# Patient Record
Sex: FEMALE | Race: WHITE
[De-identification: names, ages, dates, MRNs, and addresses within clinical notes are randomized per-mention and may not be internally consistent; named-entity substitution may affect disease eponyms.]

---

## 2018-05-14 ENCOUNTER — HOSPITAL ENCOUNTER (OUTPATIENT)
Dept: HOSPITAL 60 - LB.SDS | Age: 63
Discharge: HOME | End: 2018-05-14
Attending: ORTHOPAEDIC SURGERY
Payer: COMMERCIAL

## 2018-05-14 DIAGNOSIS — I10: ICD-10-CM

## 2018-05-14 DIAGNOSIS — M81.0: ICD-10-CM

## 2018-05-14 DIAGNOSIS — E11.9: ICD-10-CM

## 2018-05-14 DIAGNOSIS — F32.9: ICD-10-CM

## 2018-05-14 DIAGNOSIS — Z87.891: ICD-10-CM

## 2018-05-14 DIAGNOSIS — Z88.0: ICD-10-CM

## 2018-05-14 DIAGNOSIS — K21.9: ICD-10-CM

## 2018-05-14 DIAGNOSIS — E78.5: ICD-10-CM

## 2018-05-14 DIAGNOSIS — M18.11: Primary | ICD-10-CM

## 2018-05-14 DIAGNOSIS — J45.909: ICD-10-CM

## 2018-05-14 DIAGNOSIS — Z79.899: ICD-10-CM

## 2018-05-14 DIAGNOSIS — G47.00: ICD-10-CM

## 2018-05-14 DIAGNOSIS — Z91.048: ICD-10-CM

## 2018-05-15 NOTE — OR
DATE OF OPERATION:  05/14/2018

 

PREOPERATIVE DIAGNOSIS:  Advanced first carpometacarpal arthritis.

 

POSTOPERATIVE DIAGNOSIS:  Advanced first carpometacarpal arthritis.

 

PROCEDURE:  Right first carpometacarpal interpositional arthroplasty.

 

ANESTHESIA:  General.

 

ASSISTANT:  Ken Webb RN.

 

SPECIMENS:  None.

 

DRAINS:  None.

 

ESTIMATED BLOOD LOSS:  Minimal.

 

COMPLICATIONS:  None apparent.

 

DESCRIPTION OF PROCEDURE:  After informed consent was obtained, the patient was brought to

the operating room where general anesthetic was administered uneventfully.  The right upper

extremity was prepped and draped sterilely, a time-out was held, and antibiotics were

confirmed.  The incision was made over the first CMC joint at the junction of the glabrous

and non-glabrous skin.  We dissected sharply through the skin with careful scissor

dissection through subcutaneous tissues to protect and preserve the superficial radial

sensory nerve branches.  The capsule overlying the trapezium was opened longitudinally and

full-thickness flaps were established.  We subperiosteally dissected around the trapezium,

until we were able to expose it sufficiently to be able to perform a cruciate osteotomy.  It

was then removed piecemeal.  We made a short transverse incision at the junction of the

musculotendinous junction of the flexor carpi radialis, we then delivered it into the

arthroplasty space.  We used a 3-0 nylon to create an arthroplasty spacer balling the FCR.

This was then placed into the arthroplasty space.  We closed the capsule with 2-0 Vicryl

over the top of this.  We closed the skin with 4-0 nylon.  Xeroform sterile dressings were

applied as well as thumb spica splint.  The patient was brought to the recovery room in

stable condition, having tolerated the procedure well, with no apparent complications..

 

 

RYAN/HAILEY

DD:  05/14/2018 19:16:48

DT:  05/15/2018 02:19:22

Job #:  [id number]

## 2019-02-07 ENCOUNTER — HOSPITAL ENCOUNTER (OUTPATIENT)
Dept: HOSPITAL 60 - LB.SDS | Age: 64
Discharge: HOME | End: 2019-02-07
Attending: SURGERY
Payer: COMMERCIAL

## 2019-02-07 DIAGNOSIS — Z79.899: ICD-10-CM

## 2019-02-07 DIAGNOSIS — I10: ICD-10-CM

## 2019-02-07 DIAGNOSIS — K21.9: ICD-10-CM

## 2019-02-07 DIAGNOSIS — R13.10: Primary | ICD-10-CM

## 2019-02-07 DIAGNOSIS — J45.909: ICD-10-CM

## 2019-02-07 NOTE — OR
DATE OF OPERATION:  02/07/2019

 

PREOPERATIVE DIAGNOSIS:  Dysphagia.

 

POSTOPERATIVE DIAGNOSIS:  Dysphagia.

 

PROCEDURE:  Esophagogastroduodenoscopy with biopsy.

 

ANESTHESIA:  MAC.

 

ESTIMATED BLOOD LOSS:  Minimal.

 

COMPLICATIONS:  None.

 

INDICATION FOR THE PROCEDURE:  The patient is a 64-year-old female who for the past several

years has had some difficulty with swallowing.  She does feel like solids potentially and

sometimes pills do get stuck in her chest.  Does not have any trouble with liquids.  She has

been on ranitidine.  She has not tried anything else.

 

DESCRIPTION OF PROCEDURE:  Informed consent was obtained from the patient.  The patient was

taken to the operating room and placed on table in left lateral decubitus position.

Monitored anesthesia care was administered.  Esophagogastroduodenoscope was then advanced

through the mouth and directed towards the second portion of the duodenum.  Duodenum was

normal.  No signs of duodenitis.  No ulcers.  Gastric antrum did appear to be normal.  No

gastritis.  No ulcers.  Cold forceps biopsy was taken of the gastric antrum and checked that

for H. pylori.  Retroflexion performed was also otherwise unremarkable.

Esophagogastroduodenoscope was then withdrawn into the esophagus.  Very minimal esophagitis

possible, otherwise distal esophagus GE junction was normal.  Remainder of the esophagus

examined on the way out was also otherwise unremarkable.  Esophagogastroduodenoscope was

then withdrawn.  The patient tolerated the procedure well and was brought to recovery room

in good condition.

 

FINDINGS:  Possible mild esophagitis at the GE junction.

 

RECOMMENDATIONS:  We will follow up on H. pylori biopsies, otherwise would recommend

starting PPI therapy for the next 2 months and monitoring her progress.  If she continues to

have signs and symptoms of dysphagia, I would recommend esophagram.

 

 

MN/HAILEY

DD:  02/07/2019 12:23:15

DT:  02/07/2019 19:02:42

Job #:  [id number]

## 2020-07-17 NOTE — CT
DATE OF SERVICE: 07/17/20

CLINICAL DATA: Elevated D-dimer.



ENHANCED CHEST CT:  Multislice axial acquisition with IV contrast was performed.




No priors. 



No evidence of PE.  No pneumothorax.  No pleural effusions.  No aortic aneurysm 
or dissection.  



There are minimal atelectatic changes in the dependent portion of both lungs.  
There are emphysematous changes throughout both lungs.  There is a 7 mm pleural-
based nodule in the right lower lobe posteromedially.  The lung bases are 
otherwise clear. 



The heart size is normal.  No pericardial effusion.  



No hilar or mediastinal adenopathy.  



There are multiple small calcified gallstones noted in the dependent 
gallbladder.  No pericholecystic fluid.  



No other significant findings.  



IMPRESSION:  

1.  No acute abnormalities. 

2.  Cholelithiasis.  No evidence of cholecystitis.  

3.  7 mm pleural-based nodule right lower lobe.  3-6 month followup CT is 
recommended.  



[id number]

MTDD

## 2020-07-17 NOTE — EDM.PDOC
ED HPI GENERAL MEDICAL PROBLEM





- General


Chief Complaint: Lower Extremity Injury/Pain


Stated Complaint: PAIN


Time Seen by Provider: 07/17/20 10:15


Source of Information: Reports: Patient


History Limitations: Reports: No Limitations





- History of Present Illness


INITIAL COMMENTS - FREE TEXT/NARRATIVE: 





pt presents to the ER with right knee pain mostly in the posterior fossa. pt is 

post R TKA on 7 July and has been doing PT since last week. over the last 3-4 

days the pain has worsened with increased cramping in her right calf, this is 

also TTP. pt states she intermittently experiences dizzyness/lightheadedness 

with coinciding sweating. the cramping, dizzyness, lightheadedness and sweating 

increases with activity, improvement with rest. she denies fever, chills, 

nausea, cough, SOB, chest pain. 


pt provides family history of several relatives with DVT with sister dying of 

PE. pt admits to at least one missed dose of warfarin this week.


pt was evaluated in the context of the covid 19 pandemic.





- Related Data


                                    Allergies











Allergy/AdvReac Type Severity Reaction Status Date / Time


 


alcohol Allergy  Rash Verified 01/07/19 09:16


 


Penicillins Allergy  Hives Verified 01/07/19 09:16











Home Meds: 


                                    Home Meds





Amitriptyline [Elavil] 75 mg PO DAILY 05/06/18 [History]


Cyclobenzaprine HCl 10 mg PO TID 05/06/18 [History]


Fluticasone Propion/Salmeterol [Advair 250-50 Diskus] 2 inh INH BID 05/06/18 

[History]


Furosemide 40 mg PO ACDINNER 05/06/18 [History]


Hydrocodone/Acetaminophen [Hydrocodon-Acetaminophen 5-325] 1 tab PO Q6HR PRN 

05/06/18 [History]


Ipratropium [Atrovent HFA] 2 inh INH QID PRN 05/06/18 [History]


Ranitidine HCl [Ranitidine] 150 mg PO BID 05/06/18 [History]


Simvastatin [Zocor] 20 mg PO DAILY 05/06/18 [History]











Past Medical History


HEENT History: Reports: Hard of Hearing, Other (See Below)


Other HEENT History: wears hearing aid


Cardiovascular History: Reports: High Cholesterol, Hypertension, Other (See 

Below)


Other Cardiovascular History: Hyperlipidemia


Respiratory History: Reports: Asthma


Gastrointestinal History: Reports: GERD


Other Gastrointestinal History: hx of esophageal ulcers


OB/GYN History: Reports: Pregnancy


Musculoskeletal History: Reports: Osteoporosis, Other (See Below)


Other Musculoskeletal History: degenerative joint disease


Neurological History: Reports: Migraines, TIA


Psychiatric History: Reports: Depression


Endocrine/Metabolic History: Reports: Diabetes, Type II, Osteoporosis


Oncologic (Cancer) History: Reports: Basal Cell Carcinoma, Ovarian





- Infectious Disease History


Infectious Disease History: Reports: Chicken Pox





- Past Surgical History


Musculoskeletal Surgical History: Reports: Carpal Tunnel





Social & Family History





- Family History


Family Medical History: Noncontributory


HEENT: Reports: Cataract, Sinusitis


Cardiac: Reports: Hypertension


Respiratory: Reports: PE


: Reports: None


Musculoskeletal: Reports: Arthritis, Back pain, Chronic, Osteoarthritis, 

Osteoporosis


Neurological: Reports: Migraines


Endocrine/Metabolic: Reports: Diabetes, type II


Hematologic: Reports: None


Immunologic: Reports: None


Oncologic: Reports: Breast, Ovarian, Uterine, Other (See Below)


Other Oncologic Family History: fallopian





- Caffeine Use


Caffeine Use: Reports: Coffee





ED ROS GENERAL





- Review of Systems


Review Of Systems: Comprehensive ROS is negative, except as noted in HPI.





ED EXAM, GENERAL





- Physical Exam


Exam: See Below


Exam Limited By: No Limitations


General Appearance: Alert, WD/WN, No Apparent Distress


Eye Exam: Bilateral Eye: EOMI, PERRL


Head: Atraumatic, Normocephalic


Neck: Normal Inspection


Respiratory/Chest: No Respiratory Distress, Lungs Clear, Normal Breath Sounds, 

No Accessory Muscle Use


Cardiovascular: Normal Peripheral Pulses, Regular Rate, Rhythm, No JVD, No 

Murmur


Peripheral Pulses: 2+: Radial (L), Radial (R), Dorsalis Pedis (L), Dorsalis 

Pedis (R)


GI/Abdominal: Normal Bowel Sounds, Soft, Non-Tender


Extremities: Other (R calf and posterior fossa of the knee are TTP on midline. 

bedside US shows compressible veins throughout fossa and lower leg structures.)


Neurological: Alert, Oriented, CN II-XII Intact, Normal Cognition


Psychiatric: Normal Affect, Normal Mood


Skin Exam: Warm, Dry, Intact


Lymphatic: No Adenopathy





Course





- Orders/Labs/Meds


Orders: 





                               Active Orders 24 hr











 Category Date Time Status


 


 Chest 1V Frontal [CR] Stat Exams  07/17/20 10:09 Ordered


 


 CBC WITH AUTO DIFF [HEME] Stat Lab  07/17/20 10:09 Ordered


 


 COMPREHENSIVE METABOLIC PN,CMP [CHEM] Stat Lab  07/17/20 10:09 Ordered


 


 D-DIMER QUANTITATIVE [COAG] Stat Lab  07/17/20 10:09 Ordered


 


 PTT,PARTIAL THROMBOPLSTIN TIME [COAG] Stat Lab  07/17/20 10:10 Ordered














- Radiology Interpretation


Free Text/Narrative:: 





cxr shows no widened mediastinum, pneumothoracies, consolodation or infiltrates.

 no bony abnormalities.





Departure





- Departure


Time of Disposition: 13:00


Disposition: Refer to Observation


Condition: Fair


Clinical Impression: 


 DVT (deep venous thrombosis)








- Discharge Information


*PRESCRIPTION DRUG MONITORING PROGRAM REVIEWED*: Not Applicable


*COPY OF PRESCRIPTION DRUG MONITORING REPORT IN PATIENT SHAY: Not Applicable


Referrals: 


PCP,None [Primary Care Provider] - 


Forms:  ED Department Discharge





- Problem List & Annotations


(1) DVT (deep venous thrombosis)


SNOMED Code(s): 550390719


   Code(s): I82.409 - ACUTE EMBOLISM AND THOMBOS UNSP DEEP VN UNSP LOWER 

EXTREMITY   Status: Acute   Current Visit: Yes   





- Problem List Review


Problem List Initiated/Reviewed/Updated: Yes





- My Orders


Last 24 Hours: 





My Active Orders





07/17/20 10:09


Chest 1V Frontal [CR] Stat 


CBC WITH AUTO DIFF [HEME] Stat 


COMPREHENSIVE METABOLIC PN,CMP [CHEM] Stat 


D-DIMER QUANTITATIVE [COAG] Stat 





07/17/20 10:10


PTT,PARTIAL THROMBOPLSTIN TIME [COAG] Stat 














- Assessment/Plan


Last 24 Hours: 





My Active Orders





07/17/20 10:09


Chest 1V Frontal [CR] Stat 


CBC WITH AUTO DIFF [HEME] Stat 


COMPREHENSIVE METABOLIC PN,CMP [CHEM] Stat 


D-DIMER QUANTITATIVE [COAG] Stat 





07/17/20 10:10


PTT,PARTIAL THROMBOPLSTIN TIME [COAG] Stat 











Assessment:: 





assessment: DVT


plan: current INR of 1.1 after one week of coumadin


continue coumadin and initiate heparin during observation stay for care and 

prevention of further DVT





although not identified on bedside US, pt with likely DVT by clinical exam and 

Ddimer with TTP to calf and posterior fossa of right knee. will admit 

observation through Dr. Zimmer of [facility].

## 2020-07-17 NOTE — CR
DATE OF SERVICE: 07/17/20

CLINICAL DATA: Syncopal episode.



AP CHEST:  Comparison is made to a prior exam dated 10/20/14.



The heart size is normal.  There is calcification of the aortic arch. 



The lungs are clear.  No pneumothorax.  No pleural effusions. 



No evidence of acute intrathoracic disease. 



[id number]

[facility]

## 2020-07-17 NOTE — PCM.HP.2
H&P History of Present Illness





- General


Date of Service: 07/17/20


Admit Problem/Dx: 


                           Admission Diagnosis/Problem





Admission Diagnosis/Problem      DVT, Deep venous thrombosis








Source of Information: Patient, Old Records, Provider, RN, RN Notes Reviewed


History Limitations: Reports: No Limitations





- History of Present Illness


Initial Comments - Free Text/Narative: 


64 yo woman s/p R TKA 10 days ago presented to ED with c/o severe knee pain.  Pt

ran out of her po toradol 3 days ago.  Also had been prescribed Oxy-IR but had 

not been taking it due to severe upset stomach.  Hydrocodone has worked in the 

past.  Pain became so severe that she sought medical attention in the ED.  Pt. 

denies redness or warmth to knee.  It is somewhat swollen but not worse than 

usual.  Has been prescribed coumadin for post op DVT ppx.  Reportedly missed one

dose but INR is 1.1 in spite of this.  Pt does not recall length of time 

coumadin was prescribed.  Notes FH of DVT/PE and sudden death.  Pt. had CT angio

in ED that was negative for PE.  US/ Doppler did not show any obvious clots but 

US for doppler only available once per week.  Pt admitted for further eval and 

management. 





Onset of Symptoms: Reports: Gradual


Symptom Onset Date: 07/14/20


Duration of Symptoms: Reports: Day(s):, Getting Worse


Location: Reports: Lower Extremity, Right


Quality: Reports: Sharp, Throbbing


Severity: Severe


Improves with: Reports: Immobilization, Medication


Worsens with: Reports: Movement


Context: Reports: Activity/Exercise, Exertion.  Denies: Sick Contact


Associated Symptoms: Reports: No Other Symptoms





- Related Data


Allergies/Adverse Reactions: 


                                    Allergies











Allergy/AdvReac Type Severity Reaction Status Date / Time


 


alcohol Allergy  Rash Verified 07/17/20 14:28


 


Penicillins Allergy  Hives Verified 07/17/20 14:28











Home Medications: 


                                    Home Meds





Amitriptyline [Elavil] 75 mg PO DAILY 05/06/18 [History]


Cyclobenzaprine HCl 10 mg PO TID 05/06/18 [History]


Fluticasone Propion/Salmeterol [Advair 250-50 Diskus] 2 inh INH BID 05/06/18 

[History]


Furosemide 40 mg PO ACDINNER 05/06/18 [History]


Hydrocodone/Acetaminophen [Hydrocodon-Acetaminophen 5-325] 1 tab PO Q6HR PRN 

05/06/18 [History]


Ipratropium [Atrovent HFA] 2 inh INH QID PRN 05/06/18 [History]


Ranitidine HCl [Ranitidine] 150 mg PO BID 05/06/18 [History]


Simvastatin [Zocor] 20 mg PO DAILY 05/06/18 [History]











Past Medical History


HEENT History: Reports: Hard of Hearing, Other (See Below)


Other HEENT History: wears hearing aid


Cardiovascular History: Reports: High Cholesterol, Hypertension, Other (See 

Below)


Other Cardiovascular History: Hyperlipidemia


Respiratory History: Reports: Asthma


Gastrointestinal History: Reports: GERD


Other Gastrointestinal History: hx of esophageal ulcers


OB/GYN History: Reports: Pregnancy


Musculoskeletal History: Reports: Osteoporosis, Other (See Below)


Other Musculoskeletal History: degenerative joint disease


Neurological History: Reports: Migraines, TIA


Psychiatric History: Reports: Depression


Endocrine/Metabolic History: Reports: Diabetes, Type II, Osteoporosis


Oncologic (Cancer) History: Reports: Basal Cell Carcinoma, Ovarian





- Infectious Disease History


Infectious Disease History: Reports: Chicken Pox





- Past Surgical History


Musculoskeletal Surgical History: Reports: Carpal Tunnel





Social & Family History





- Family History


Family Medical History: Noncontributory


HEENT: Reports: Cataract, Sinusitis


Cardiac: Reports: Hypertension


Respiratory: Reports: PE


: Reports: None


Musculoskeletal: Reports: Arthritis, Back pain, Chronic, Osteoarthritis, 

Osteoporosis


Neurological: Reports: Migraines


Endocrine/Metabolic: Reports: Diabetes, type II


Hematologic: Reports: None


Immunologic: Reports: None


Oncologic: Reports: Breast, Ovarian, Uterine, Other (See Below)


Other Oncologic Family History: fallopian





- Tobacco Use


Smoking Status *Q: Former Smoker


Tobacco Use Within Last Twelve Months: Cigarettes





- Caffeine Use


Caffeine Use: Reports: Coffee





- Alcohol Use


Alcohol Use History: No





- Living Situation & Occupation


Living situation: Reports: , with Spouse





H&P Review of Systems





- Review of Systems:


Review Of Systems: See Below


General: Reports: No Symptoms


HEENT: Reports: No Symptoms


Pulmonary: Reports: No Symptoms.  Denies: Shortness of Breath, Wheezing


Cardiovascular: Reports: No Symptoms.  Denies: Chest Pain, Palpitations, Dyspnea

 on Exertion


Gastrointestinal: Reports: Diarrhea.  Denies: Black Stool, Bloody Stool


Psychiatric: Reports: No Symptoms


Neurological: Reports: No Symptoms


Hematologic/Lymphatic: Reports: No Symptoms


Immunologic: Reports: No Symptoms





Exam





- Exam


Exam: See Below





- Vital Signs


Vital Signs: 


See EMR





- Exam


General: Alert, Oriented, Cooperative


HEENT: EOMI, Hearing Intact, Nares Patent, Posterior Pharynx Clear


Neck: Trachea Midline


Lungs: Clear to Auscultation, Normal Respiratory Effort.  No: Rhonchi, Stridor, 

Wheezing


Cardiovascular: Regular Rate, Regular Rhythm, Normal S1, Normal S2


Extremities: Other (trace edema bilaterally, intact dressing, no erythema over 

knee noted.)


Neurological: Cranial Nerves Intact, Normal Speech, Normal Tone





- Patient Data


Lab Results Last 24 hrs: 


                         Laboratory Results - last 24 hr











  07/17/20 07/17/20 07/17/20 Range/Units





  10:30 10:30 10:30 


 


WBC  7.5    (4.0-11.0)  K/uL


 


RBC  3.82    (3.80-5.80)  M/uL


 


Hgb  10.9 L    (11.5-16.5)  g/dL


 


Hct  33.9 L    (37.0-47.0)  %


 


MCV  89    (76-96)  fL


 


MCH  28.5    (27.0-32.0)  pg


 


MCHC  32.2    (31.0-35.0)  g/dL


 


RDW  14.1    (11.0-16.0)  %


 


Plt Count  474  D    (150-500)  K/uL


 


MPV  9.0    (6.0-10.0)  fL


 


Neut % (Auto)  53.5    (45.0-70.0)  %


 


Lymph % (Auto)  26.0    (20.0-40.0)  %


 


Mono % (Auto)  10.7 H    (3.0-10.0)  %


 


Eos % (Auto)  9.1 H    (1.0-5.0)  %


 


Baso % (Auto)  0.7 H    (0.0-0.5)  %


 


Neut # (Auto)  3.99    (2.00-7.50)  K/uL


 


Lymph # (Auto)  1.94    (1.50-4.00)  K/uL


 


Mono # (Auto)  0.80    (0.20-0.80)  K/uL


 


Eos # (Auto)  0.68 H    (0.04-0.40)  K/uL


 


Baso # (Auto)  0.05    (0.02-0.10)  K/uL


 


APTT     (24.4-33.2)  SECONDS


 


D-Dimer, Quantitative   4400 H   (0-400)  ng/mL


 


Sodium    136  (136-145)  mmol/L


 


Potassium    3.8  (3.5-5.1)  mmol/L


 


Chloride    100  ()  mmol/L


 


Carbon Dioxide    29.3  (21.0-32.0)  mmol/L


 


Anion Gap    10.5  (5.0-15.0)  mmol/L


 


BUN    19  D  (8-26)  mg/dL


 


Creatinine    1.03 H D  (0.55-1.02)  mg/dL


 


Est Cr Clr Drug Dosing    TNP  


 


Estimated GFR (MDRD)    54 L  (>60)  MLS/MIN


 


BUN/Creatinine Ratio    18.4  (6-25)  


 


Glucose    107 H  ()  mg/dL


 


Calcium    9.4  (8.5-10.1)  mg/dL


 


Total Bilirubin    0.4  D  (0.0-1.0)  mg/dL


 


AST    23  (15-37)  U/L


 


ALT    28  (12-78)  U/L


 


Alkaline Phosphatase    79  ()  U/L


 


Troponin I     (0.000-0.060)  ng/mL


 


Total Protein    7.4  (6.4-8.2)  g/dL


 


Albumin    3.0 L  (3.4-5.0)  g/dL


 


Globulin    4.4 H  (2.2-4.2)  g/dL


 


Albumin/Globulin Ratio    0.7 L  (0.8-2.0)  














  07/17/20 07/17/20 Range/Units





  10:30 10:30 


 


WBC    (4.0-11.0)  K/uL


 


RBC    (3.80-5.80)  M/uL


 


Hgb    (11.5-16.5)  g/dL


 


Hct    (37.0-47.0)  %


 


MCV    (76-96)  fL


 


MCH    (27.0-32.0)  pg


 


MCHC    (31.0-35.0)  g/dL


 


RDW    (11.0-16.0)  %


 


Plt Count    (150-500)  K/uL


 


MPV    (6.0-10.0)  fL


 


Neut % (Auto)    (45.0-70.0)  %


 


Lymph % (Auto)    (20.0-40.0)  %


 


Mono % (Auto)    (3.0-10.0)  %


 


Eos % (Auto)    (1.0-5.0)  %


 


Baso % (Auto)    (0.0-0.5)  %


 


Neut # (Auto)    (2.00-7.50)  K/uL


 


Lymph # (Auto)    (1.50-4.00)  K/uL


 


Mono # (Auto)    (0.20-0.80)  K/uL


 


Eos # (Auto)    (0.04-0.40)  K/uL


 


Baso # (Auto)    (0.02-0.10)  K/uL


 


APTT  29.9   (24.4-33.2)  SECONDS


 


D-Dimer, Quantitative    (0-400)  ng/mL


 


Sodium    (136-145)  mmol/L


 


Potassium    (3.5-5.1)  mmol/L


 


Chloride    ()  mmol/L


 


Carbon Dioxide    (21.0-32.0)  mmol/L


 


Anion Gap    (5.0-15.0)  mmol/L


 


BUN    (8-26)  mg/dL


 


Creatinine    (0.55-1.02)  mg/dL


 


Est Cr Clr Drug Dosing    


 


Estimated GFR (MDRD)    (>60)  MLS/MIN


 


BUN/Creatinine Ratio    (6-25)  


 


Glucose    ()  mg/dL


 


Calcium    (8.5-10.1)  mg/dL


 


Total Bilirubin    (0.0-1.0)  mg/dL


 


AST    (15-37)  U/L


 


ALT    (12-78)  U/L


 


Alkaline Phosphatase    ()  U/L


 


Troponin I   < 0.017  (0.000-0.060)  ng/mL


 


Total Protein    (6.4-8.2)  g/dL


 


Albumin    (3.4-5.0)  g/dL


 


Globulin    (2.2-4.2)  g/dL


 


Albumin/Globulin Ratio    (0.8-2.0)  











Result Diagrams: 


                                 07/17/20 10:30





                                 07/17/20 10:30





Sepsis Event Note





- Focused Exam


Date Exam was Performed: 07/17/20


Time Exam was Performed: 14:29





- Problem List


(1) S/P total knee arthroplasty


SNOMED Code(s): 7567868648065, 853807359, 7103646401891


   ICD Code: Z96.659 - PRESENCE OF UNSPECIFIED ARTIFICIAL KNEE JOINT   Status: 

Acute   Current Visit: Yes   


Qualifiers: 


   Laterality: right   Qualified Code(s): Z96.651 - Presence of right artificial

 knee joint   





(2) Pain


SNOMED Code(s): 27173776


   ICD Code: R52 - PAIN, UNSPECIFIED   Status: Acute   Priority: High   Current 

Visit: Yes   





(3) Family history of DVT


SNOMED Code(s): 873512105


   ICD Code: Z82.49 - FAMILY HX OF ISCHEM HEART DIS AND OTH DIS OF THE CIRC SYS 

  Status: Acute   Priority: Medium   Current Visit: Yes   





(4) Subtherapeutic international normalized ratio (INR)


SNOMED Code(s): 914057004, 957354632


   ICD Code: R79.1 - ABNORMAL COAGULATION PROFILE   Status: Acute   Priority: 

High   Current Visit: Yes   


Problem List Initiated/Reviewed/Updated: Yes


Orders Last 24hrs: 


                               Active Orders 24 hr











 Category Date Time Status


 


 Patient Status [ADT] Routine ADT  07/17/20 14:14 Active


 


 Ambulate [RC] ASDIRECTED Care  07/17/20 14:21 Active


 


 Anticoag Warfarin Education *Q [RC] DAILY Care  07/17/20 14:21 Active


 


 [facility], [facility]  07/17/20 14:21 Active


 


 Communication Order [RC] PER UNIT ROUTINE Care  07/17/20 14:21 Active


 


 Diabetes Education [RC] Click to Edit Care  07/17/20 14:25 Active


 


 Height and Weight [RC] DAILY Care  07/17/20 14:21 Active


 


 Intake and Output [RC] QSHIFT Care  07/17/20 14:22 Active


 


 May Shower [RC] ASDIRECTED Care  07/17/20 14:21 Active


 


 Notify Provider [RC] PRN Care  07/17/20 14:24 Active


 


 Oxygen Therapy [RC] PRN Care  07/17/20 14:14 Active


 


 Oxygen Therapy [RC] PRN Care  07/17/20 14:22 Active


 


 Oxygen Therapy [RC] PRN Care  07/17/20 14:22 Active


 


 Up ad Ara [RC] ASDIRECTED Care  07/17/20 14:21 Active


 


 VTE/DVT Education [RC] PER UNIT ROUTINE Care  07/17/20 14:22 Active


 


 VTE/DVT Education [RC] Per Unit Routine Care  07/17/20 14:14 Active


 


 VTE/DVT Education [RC] Per Unit Routine Care  07/17/20 14:22 Active


 


 Vital Signs [RC] Q4H Care  07/17/20 14:14 Active


 


 Vital Signs [RC] Q4H Care  07/17/20 14:22 Active


 


 Consult to Case Management/ [CONS] Cons  07/17/20 14:21 Active





 Routine   


 


 OT Evaluation and Treatment [CONS] Routine Cons  07/17/20 14:21 Active


 


 PT Evaluation and Treatment [CONS] Routine Cons  07/17/20 14:21 Active


 


 Consistent Carbohydrate Diet [DIET] Diet  07/17/20 Dinner Ordered


 


 BASIC METABOLIC PANEL,BMP [CHEM] AM Lab  07/18/20 05:11 Ordered


 


 CBC WITH AUTO DIFF [HEME] AM Lab  07/18/20 05:11 Ordered


 


 INR,PT,PROTHROMBIN TIME [COAG] DAILY Lab  07/17/20 14:30 Ordered


 


 Acetaminophen [Tylenol] Med  07/17/20 14:21 Ordered





 650 mg PO Q4H PRN   


 


 Acetaminophen/HYDROcodone [Norco 325-5 MG] Med  07/17/20 14:21 Ordered





 2 tab PO Q4H PRN   


 


 Docusate Sodium/Sennosides [Senna Plus] Med  07/17/20 14:21 Ordered





 1 tab PO BID PRN   


 


 HYDROmorphone [Dilaudid] Med  07/17/20 14:27 Ordered





 0.4 mg IVPUSH Q2H PRN   


 


 Ondansetron [Zofran] Med  07/17/20 14:21 Ordered





 4 mg IV Q6H PRN   


 


 Warfarin [Coumadin] Med  07/17/20 18:00 Ordered





 5 mg PO DAILY@1800   


 


 fentaNYL [Sublimaze] Med  07/17/20 11:03 Active





 50 mcg IVPUSH Q5M PRN   


 


 fentaNYL [Sublimaze] Med  07/17/20 12:16 Active





 50 mcg IVPUSH Q5M PRN   


 


 polyethylene glycoL 3350 [MiraLAX] Med  07/17/20 14:21 Ordered





 17 gm PO DAILY PRN   


 


 Glucose Management Sub Q Reflex [OM.PC] Click to Edit Oth  07/17/20 14:21 

Ordered


 


 Resuscitation Status Routine Resus Stat  07/17/20 14:14 Ordered








                                Medication Orders





Acetaminophen (Tylenol)  650 mg PO Q4H PRN


   PRN Reason: Pain (Mild 1-3)/fever


Hydrocodone Bitart/Acetaminophen (Norco 325-5 Mg)  2 tab PO Q4H PRN


   PRN Reason: Pain (moderate 4-6)


Fentanyl (Sublimaze)  50 mcg IVPUSH Q5M PRN


   PRN Reason: Pain


   Stop: 07/17/20 23:59


Fentanyl (Sublimaze)  50 mcg IVPUSH Q5M PRN


   PRN Reason: Pain


Ondansetron HCl (Zofran)  4 mg IV Q6H PRN


   PRN Reason: Nausea/Vomiting


Polyethylene Glycol (Miralax)  17 gm PO DAILY PRN


   PRN Reason: Constipation


Senna/Docusate Sodium (Senna Plus)  1 tab PO BID PRN


   PRN Reason: Constipation


Warfarin Sodium (Coumadin)  5 mg PO DAILY@1800 [facility]








Assessment/Plan Comment:: 





1.  R TKA with FH of PE/DVT and subtherapeutic INR


* Suspect today's event was due to pain and running out of medications


* Restart Hydrocodone as patient has safely taken this in the past.


* Add prn dilaudid IV for severe pain as this is less likely to cause nausea


* Continue Coumadin at 5 mg daily


* Add Lovenox for bridging. 


* Daily INR


* Pt will need bridging until INR >2.0


* Monitor hemoglobin


* Continue PT/OT


* Ambulate ad ara


2.  Chronic Medical Issues--continue home medications.


* DM


* Migraines


* Hyperlipidemia


Discharge/Disposition: Likely to home with services.


CODE Status:  FULL CODE


DVT Ppx:  Full Dose anticoagulation

## 2020-07-18 NOTE — PCM.PN
- General Info


Date of Service: 07/18/20


Admission Dx/Problem (Free Text): 


                           Admission Diagnosis/Problem





Admission Diagnosis/Problem      DVT, Deep venous thrombosis








Subjective Update: 





pt states she is overall better, pain is better controlled compared to last few 

days. pain with ambulation has improved. pt continues to deny SOB, chest pain 

although the intermittent cold sweats are problematic for her. no nausea or 

appetite loss. 


Functional Status: Reports: Pain Controlled, Ambulating, Incentive Spirometry





- Review of Systems


General: Reports: No Symptoms


HEENT: Reports: No Symptoms


Pulmonary: Reports: No Symptoms


Cardiovascular: Reports: No Symptoms


Gastrointestinal: Reports: No Symptoms


Genitourinary: Reports: No Symptoms


Musculoskeletal: Reports: Leg Pain, Foot Pain (heel)


Skin: Reports: No Symptoms


Neurological: Reports: No Symptoms





- Patient Data


Vitals - Most Recent: 


                                Last Vital Signs











Temp  97.1 F   07/18/20 08:00


 


Pulse  78   07/18/20 08:00


 


Resp  18   07/18/20 08:00


 


BP  143/74 H  07/18/20 08:00


 


Pulse Ox  91 L  07/18/20 08:00











Weight - Most Recent: 168 lb


Lab Results Last 24 Hours: 


                         Laboratory Results - last 24 hr











  07/17/20 07/18/20 07/18/20 Range/Units





  20:16 07:45 07:45 


 


WBC   5.6  D   (4.0-11.0)  K/uL


 


RBC   3.79 L   (3.80-5.80)  M/uL


 


Hgb   10.8 L   (11.5-16.5)  g/dL


 


Hct   34.2 L   (37.0-47.0)  %


 


MCV   90   (76-96)  fL


 


MCH   28.5   (27.0-32.0)  pg


 


MCHC   31.6   (31.0-35.0)  g/dL


 


RDW   14.1   (11.0-16.0)  %


 


Plt Count   448   (150-500)  K/uL


 


MPV   9.2   (6.0-10.0)  fL


 


Neut % (Auto)   52.0   (45.0-70.0)  %


 


Lymph % (Auto)   25.9   (20.0-40.0)  %


 


Mono % (Auto)   9.8   (3.0-10.0)  %


 


Eos % (Auto)   11.6 H   (1.0-5.0)  %


 


Baso % (Auto)   0.7 H   (0.0-0.5)  %


 


Neut # (Auto)   2.90   (2.00-7.50)  K/uL


 


Lymph # (Auto)   1.45 L   (1.50-4.00)  K/uL


 


Mono # (Auto)   0.55   (0.20-0.80)  K/uL


 


Eos # (Auto)   0.65 H   (0.04-0.40)  K/uL


 


Baso # (Auto)   0.04   (0.02-0.10)  K/uL


 


PT     (9.0-11.5)  sec


 


INR     (1.0-3.5)  


 


Sodium    136  (136-145)  mmol/L


 


Potassium    3.9  (3.5-5.1)  mmol/L


 


Chloride    102  ()  mmol/L


 


Carbon Dioxide    29.7  (21.0-32.0)  mmol/L


 


Anion Gap    8.2  (5.0-15.0)  mmol/L


 


BUN    14  D  (8-26)  mg/dL


 


Creatinine    0.75  D  (0.55-1.02)  mg/dL


 


Est Cr Clr Drug Dosing    55.68  mL/min


 


Estimated GFR (MDRD)    > 60  (>60)  MLS/MIN


 


BUN/Creatinine Ratio    18.7  (6-25)  


 


Glucose    112 H  ()  mg/dL


 


POC Glucose  109    ()  mg/dL


 


Calcium    9.1  (8.5-10.1)  mg/dL














  07/18/20 07/18/20 Range/Units





  07:45 11:24 


 


WBC    (4.0-11.0)  K/uL


 


RBC    (3.80-5.80)  M/uL


 


Hgb    (11.5-16.5)  g/dL


 


Hct    (37.0-47.0)  %


 


MCV    (76-96)  fL


 


MCH    (27.0-32.0)  pg


 


MCHC    (31.0-35.0)  g/dL


 


RDW    (11.0-16.0)  %


 


Plt Count    (150-500)  K/uL


 


MPV    (6.0-10.0)  fL


 


Neut % (Auto)    (45.0-70.0)  %


 


Lymph % (Auto)    (20.0-40.0)  %


 


Mono % (Auto)    (3.0-10.0)  %


 


Eos % (Auto)    (1.0-5.0)  %


 


Baso % (Auto)    (0.0-0.5)  %


 


Neut # (Auto)    (2.00-7.50)  K/uL


 


Lymph # (Auto)    (1.50-4.00)  K/uL


 


Mono # (Auto)    (0.20-0.80)  K/uL


 


Eos # (Auto)    (0.04-0.40)  K/uL


 


Baso # (Auto)    (0.02-0.10)  K/uL


 


PT  11.6 H   (9.0-11.5)  sec


 


INR  1.1   (1.0-3.5)  


 


Sodium    (136-145)  mmol/L


 


Potassium    (3.5-5.1)  mmol/L


 


Chloride    ()  mmol/L


 


Carbon Dioxide    (21.0-32.0)  mmol/L


 


Anion Gap    (5.0-15.0)  mmol/L


 


BUN    (8-26)  mg/dL


 


Creatinine    (0.55-1.02)  mg/dL


 


Est Cr Clr Drug Dosing    mL/min


 


Estimated GFR (MDRD)    (>60)  MLS/MIN


 


BUN/Creatinine Ratio    (6-25)  


 


Glucose    ()  mg/dL


 


POC Glucose   97  ()  mg/dL


 


Calcium    (8.5-10.1)  mg/dL











Enio Results Last 24 Hours: 


                                  Microbiology











 07/17/20 15:14 MRSA Surveillance Culture - Final





 Nasal, Unspecified    NO MRSA ISOLATED











Med Orders - Current: 


                               Current Medications





Acetaminophen (Tylenol)  650 mg PO Q4H PRN


   PRN Reason: Pain (Mild 1-3)/fever


Hydrocodone Bitart/Acetaminophen (Norco 325-5 Mg)  2 tab PO Q4H PRN


   PRN Reason: Pain (moderate 4-6)


   Last Admin: 07/17/20 15:35 Dose:  2 tab


   Documented by: 


Enoxaparin Sodium (Lovenox)  100 mg SUBCUT DAILY LETY


   Last Admin: 07/18/20 08:09 Dose:  100 mg


   Documented by: 


Fentanyl (Sublimaze)  50 mcg IVPUSH Q5M PRN


   PRN Reason: Pain


   Last Admin: 07/17/20 12:17 Dose:  50 mcg


   Documented by: 


Hydromorphone HCl (Dilaudid)  0.4 mg IVPUSH Q2H PRN


   PRN Reason: PAIN


Ondansetron HCl (Zofran)  4 mg IV Q6H PRN


   PRN Reason: Nausea/Vomiting


Polyethylene Glycol (Miralax)  17 gm PO DAILY PRN


   PRN Reason: Constipation


Senna/Docusate Sodium (Senna Plus)  1 tab PO BID PRN


   PRN Reason: Constipation


Warfarin Sodium (Coumadin)  5 mg PO DAILY@1800 LETY


   Last Admin: 07/17/20 18:03 Dose:  5 mg


   Documented by: 





Discontinued Medications





Fentanyl (Sublimaze)  50 mcg IVPUSH Q5M PRN


   PRN Reason: Pain


   Stop: 07/17/20 23:59


   Last Admin: 07/17/20 12:27 Dose:  50 mcg


   Documented by: 











- Exam


Quality Assessment: DVT Prophylaxis


General: Alert, Oriented


HEENT: Pupils Equal, Pupils Reactive, EOMI


Lungs: Clear to Auscultation, Normal Respiratory Effort


Cardiovascular: Regular Rate, Regular Rhythm, No Murmurs


Extremities: Normal Inspection, Normal Range of Motion, No Pedal Edema, Normal 

Capillary Refill


Peripheral Pulses: 2+: Radial (L), Radial (R), Posterior Tibial (L), Posterior 

Tibial (R), Dorsalis Pedis (L), Dorsalis Pedis (R)


Skin: Warm, Dry, Intact


Neurological: No New Focal Deficit


Psy/Mental Status: Alert, Normal Affect, Normal Mood





Sepsis Event Note





- Evaluation


Sepsis Screening Result: No Definite Risk





- Focused Exam


Vital Signs: 


                                   Vital Signs











  Temp Temp Pulse Resp BP Pulse Ox


 


 07/18/20 08:00   97.1 F  78  18  143/74 H  91 L


 


 07/18/20 03:55  96.7 F L   88  18  153/73 H  92 L











Date Exam was Performed: 07/18/20


Time Exam was Performed: 13:59





- Problem List & Annotations


(1) S/P total knee arthroplasty


SNOMED Code(s): 3128533164053, 046044296, 0202922699011


   Code(s): Z96.659 - PRESENCE OF UNSPECIFIED ARTIFICIAL KNEE JOINT   Status: 

Acute   Current Visit: Yes   


Qualifiers: 


   Laterality: right   Qualified Code(s): Z96.651 - Presence of right artificial

knee joint   





- Problem List Review


Problem List Initiated/Reviewed/Updated: Yes





- My Orders


Last 24 Hours: 


My Active Orders





07/17/20 14:14


Patient Status [ADT] Routine 


Oxygen Therapy [RC] PRN 


VTE/DVT Education [RC] Per Unit Routine 


Vital Signs [RC] Q4H 


Resuscitation Status Routine 





07/17/20 15:54


HYDROmorphone [Dilaudid]   0.4 mg IVPUSH Q2H PRN 














- Plan


Plan:: 





1.  R TKA with FH of PE/DVT and subtherapeutic INR


* continue Hydrocodone 


* Continue Coumadin at 5 mg daily


* continue Lovenox for bridging. pt will self administer next dose to show 

  discharge readiness


* Daily INR


* Pt will need bridging until INR >2.0


* Monitor hemoglobin


* resume PT/OT at discharge 


* Ambulate ad jacob


2.  Chronic Medical Issues--continue home medications.


* DM


* Migraines


* Hyperlipidemia


Discharge/Disposition: home tomorrow


CODE Status:  FULL CODE


DVT Ppx:  Full Dose anticoagulation

## 2020-07-19 NOTE — [id number]
**Discharge Summary





- Hospital Course


Free Text/Narrative:: 





Patient evaluated initially in the emergency department for lower right 

leg/right calf pain and an elevated d-dimer, as well as INR not within 

therapeutic range.  Admitted observation to initiate bridge therapy with Lovenox

for the next 7 days while INR becomes therapeutic on warfarin.  Patient's pain 

was controlled with oral pain medications while inpatient.  No physical therapy 

in house during this weekend but patient will resume Monday.  Current plan to 

discharge home today after lunch.  Patient states she is motivated for discharge

home.





- Discharge Data


Discharge Date: 07/19/20


Discharge Disposition: Home, Self-Care 01


Condition: Good





- Referral to Home Health


Primary Care Physician: 


PCP None








- Discharge Diagnosis/Problem(s)


(1) S/P total knee arthroplasty


SNOMED Code(s): 8815968414861, 242008937, 6917697769566


   ICD Code: Z96.659 - PRESENCE OF UNSPECIFIED ARTIFICIAL KNEE JOINT   Status: 

Acute   Current Visit: Yes   


Qualifiers: 


   Laterality: right   Qualified Code(s): Z96.651 - Presence of right artificial

knee joint   





(2) Pain


SNOMED Code(s): 92214108


   ICD Code: R52 - PAIN, UNSPECIFIED   Status: Acute   Priority: High   Current 

Visit: Yes   





(3) Family history of DVT


SNOMED Code(s): 464346867


   ICD Code: Z82.49 - FAMILY HX OF ISCHEM HEART DIS AND OTH DIS OF THE CIRC SYS 

 Status: Acute   Priority: Medium   Current Visit: Yes   





(4) Subtherapeutic international normalized ratio (INR)


SNOMED Code(s): 914888175, 175041300


   ICD Code: R79.1 - ABNORMAL COAGULATION PROFILE   Status: Acute   Priority: 

High   Current Visit: Yes   





- Patient Summary/Data


Consults: 


                                  Consultations





07/17/20 14:21


Consult to Case Management/ [CONS] Routine 


   Comment: 


   Physician Instructions: 


   Service(s) to be Consulted: Case Management


OT Evaluation and Treatment [CONS] Routine 


   Please Evaluate and Treat.


   OT Reason for Consult: ADL's


   This query below is only for informational purposes and is not editable.


   Admission Diagnosis/Problem: DVT, Deep venous thrombosis


PT Evaluation and Treatment [CONS] Routine 


   Please Evaluate and Treat.


   PT Reason for Consult: Post op Ortho Surgery


   This query below is only for informational purposes and is not editable.


   Admission Diagnosis/Problem: DVT, Deep venous thrombosis











Hospital Course: 





Initiation of Lovenox for bridge to therapeutic INR while on warfarin.  

Prescription provided on discharge for 5 days post discharge.


Patient up ad jacob. in room as tolerated and throughout hallways intermittently 

throughout stay with walker.


Patient successfully able to provide own Lovenox injections, high likelihood of 

success with bridge therapy as long she follows up with Coumadin clinic and 

takes warfarin as prescribed.





- Patient Instructions


Diet: Heart Healthy Diet


Activity: Apply Ice (and heat), As Tolerated


Driving: Do Not Drive (do not drive as long as you are taking narcotic pain 

medication that day.)


Showering/Bathing: May Shower


Wound/Incision Care: Keep Operative Site/Wound Site Clean and Dry (staples will 

be removed on tuesday with Doreen)


Notify Provider of: Fever, Increased Pain, Swelling and Redness, Drainage





- Discharge Plan


*PRESCRIPTION DRUG MONITORING PROGRAM REVIEWED*: No


*COPY OF PRESCRIPTION DRUG MONITORING REPORT IN PATIENT SHAY: No


Prescriptions/Med Rec: 


Enoxaparin Sodium [Lovenox] 100 mg SQ DAILY #5 ml


Home Medications: 


                                    Home Meds





Amitriptyline [Elavil] 75 mg PO DAILY 05/06/18 [History]


Cyclobenzaprine HCl 10 mg PO TID PRN 05/06/18 [History]


Fluticasone Propion/Salmeterol [Advair 250-50 Diskus] 2 inh INH BID 05/06/18 

[History]


Furosemide 40 mg PO ACDINNER PRN 05/06/18 [History]


Hydrocodone/Acetaminophen [Hydrocodon-Acetaminophen 5-325] 1 tab PO Q6HR PRN 

05/06/18 [History]


Ipratropium [Atrovent HFA] 2 inh INH QID PRN 05/06/18 [History]


Simvastatin [Zocor] 20 mg PO DAILY 05/06/18 [History]


Enoxaparin Sodium [Lovenox] 100 mg SQ DAILY #5 ml 07/19/20 [Rx]


Omeprazole 40 mg PO DAILY 07/19/20 [History]


metFORMIN [Glucophage] 500 mg PO BID 07/19/20 [History]








Oxygen Therapy Mode: Room Air


Patient Handouts:  Vitamin K Foods and Warfarin, What You Need to Know About 

Warfarin, Enoxaparin injection, Deep Vein Thrombosis, Venous Thromboembolism 

Prevention


Forms:  ED Department Discharge


Referrals: 


PCP,None [Primary Care Provider] - 





- Discharge Summary/Plan Comment


DC Time >30 min.: Yes


Discharge Summary/Plan Comment: 





Discharge home, follow-up with physical therapy starting tomorrow.


Repeat INR on Tuesday prior to orthopedic follow-up appointment.


Continue bridge therapy as patient is high risk for DVT as she was admitted 

observation with suspected DVT this weekend.





- General Info


Date of Service: 07/19/20


Admission Dx/Problem (Free Text: 


                           Admission Diagnosis/Problem





Admission Diagnosis/Problem      DVT, Deep venous thrombosis








Subjective Update: 





pt states she is overall better, pain continues to be controlled. pain with 

ambulation has improved. pt continues to deny SOB, chest pain, nausea or 

appetite loss. 


Functional Status: Reports: Pain Controlled, Tolerating Diet, Ambulating, 

Urinating, Incentive Spirometry





- Review of Systems


General: Reports: No Symptoms


HEENT: Reports: No Symptoms


Pulmonary: Reports: No Symptoms


Cardiovascular: Reports: No Symptoms


Gastrointestinal: Reports: No Symptoms


Genitourinary: Reports: No Symptoms


Musculoskeletal: Reports: Leg Pain


Skin: Reports: No Symptoms


Neurological: Reports: No Symptoms





- Patient Data


Vitals - Most Recent: 


                                Last Vital Signs











Temp  98 F   07/19/20 08:00


 


Pulse  78   07/19/20 08:00


 


Resp  18   07/19/20 08:00


 


BP  150/77 H  07/19/20 08:00


 


Pulse Ox  92 L  07/19/20 08:00











Weight - Most Recent: 166 lb 4 oz


Lab Results - Last 24 hrs: 


                         Laboratory Results - last 24 hr











  07/18/20 07/18/20 07/19/20 Range/Units





  16:07 20:50 07:15 


 


PT     (9.0-11.5)  sec


 


INR     (1.0-3.5)  


 


Sodium    139  (136-145)  mmol/L


 


Potassium    4.2  (3.5-5.1)  mmol/L


 


Chloride    103  ()  mmol/L


 


Carbon Dioxide    32.1 H  (21.0-32.0)  mmol/L


 


Anion Gap    8.1  (5.0-15.0)  mmol/L


 


BUN    16  (8-26)  mg/dL


 


Creatinine    0.80  (0.55-1.02)  mg/dL


 


Est Cr Clr Drug Dosing    52.20  mL/min


 


Estimated GFR (MDRD)    > 60  (>60)  MLS/MIN


 


BUN/Creatinine Ratio    20.0  (6-25)  


 


Glucose    111 H  ()  mg/dL


 


POC Glucose  97  94   ()  mg/dL


 


Calcium    9.3  (8.5-10.1)  mg/dL


 


Total Bilirubin    0.3  (0.0-1.0)  mg/dL


 


AST    16  (15-37)  U/L


 


ALT    22  (12-78)  U/L


 


Alkaline Phosphatase    76  ()  U/L


 


Troponin I    < 0.017  (0.000-0.060)  ng/mL


 


Total Protein    7.2  (6.4-8.2)  g/dL


 


Albumin    3.0 L  (3.4-5.0)  g/dL


 


Globulin    4.2  (2.2-4.2)  g/dL


 


Albumin/Globulin Ratio    0.7 L  (0.8-2.0)  














  07/19/20 07/19/20 Range/Units





  07:15 12:03 


 


PT  12.2 H   (9.0-11.5)  sec


 


INR  1.2   (1.0-3.5)  


 


Sodium    (136-145)  mmol/L


 


Potassium    (3.5-5.1)  mmol/L


 


Chloride    ()  mmol/L


 


Carbon Dioxide    (21.0-32.0)  mmol/L


 


Anion Gap    (5.0-15.0)  mmol/L


 


BUN    (8-26)  mg/dL


 


Creatinine    (0.55-1.02)  mg/dL


 


Est Cr Clr Drug Dosing    mL/min


 


Estimated GFR (MDRD)    (>60)  MLS/MIN


 


BUN/Creatinine Ratio    (6-25)  


 


Glucose    ()  mg/dL


 


POC Glucose   93  ()  mg/dL


 


Calcium    (8.5-10.1)  mg/dL


 


Total Bilirubin    (0.0-1.0)  mg/dL


 


AST    (15-37)  U/L


 


ALT    (12-78)  U/L


 


Alkaline Phosphatase    ()  U/L


 


Troponin I    (0.000-0.060)  ng/mL


 


Total Protein    (6.4-8.2)  g/dL


 


Albumin    (3.4-5.0)  g/dL


 


Globulin    (2.2-4.2)  g/dL


 


Albumin/Globulin Ratio    (0.8-2.0)  











JUSTIN Results - Last 24 hrs: 


                                  Microbiology











 07/17/20 15:14 MRSA Surveillance Culture - Final





 Nasal, Unspecified    NO MRSA ISOLATED











Med Orders - Current: 


                               Current Medications





Acetaminophen (Tylenol)  650 mg PO Q4H PRN


   PRN Reason: Pain (Mild 1-3)/fever


Hydrocodone Bitart/Acetaminophen (Norco 325-5 Mg)  2 tab PO Q4H PRN


   PRN Reason: Pain (moderate 4-6)


   Last Admin: 07/19/20 09:09 Dose:  2 tab


   Documented by: 


Enoxaparin Sodium (Lovenox)  100 mg SUBCUT DAILY [facility]


   Last Admin: 07/19/20 08:11 Dose:  100 mg


   Documented by: 


Fentanyl (Sublimaze)  50 mcg IVPUSH Q5M PRN


   PRN Reason: Pain


   Last Admin: 07/17/20 12:17 Dose:  50 mcg


   Documented by: 


Hydromorphone HCl (Dilaudid)  0.4 mg IVPUSH Q2H PRN


   PRN Reason: PAIN


Ondansetron HCl (Zofran)  4 mg IV Q6H PRN


   PRN Reason: Nausea/Vomiting


Polyethylene Glycol (Miralax)  17 gm PO DAILY PRN


   PRN Reason: Constipation


Senna/Docusate Sodium (Senna Plus)  1 tab PO BID PRN


   PRN Reason: Constipation


Warfarin Sodium (Coumadin)  5 mg PO DAILY@1800 [facility]


   Last Admin: 07/18/20 18:06 Dose:  5 mg


   Documented by: 





Discontinued Medications





Fentanyl (Sublimaze)  50 mcg IVPUSH Q5M PRN


   PRN Reason: Pain


   Stop: 07/17/20 23:59


   Last Admin: 07/17/20 12:27 Dose:  50 mcg


   Documented by: 











- Exam


Quality Assessment: Reports: DVT Prophylaxis


General: Reports: Alert, Oriented


HEENT: Reports: Pupils Equal, Pupils Reactive, EOMI


Lungs: Reports: Clear to Auscultation, Normal Respiratory Effort


Cardiovascular: Reports: Regular Rate, Regular Rhythm


GI/Abdominal Exam: Normal Bowel Sounds, Soft, Non-Tender


Back Exam: Reports: Normal Inspection, Full Range of Motion


Extremities: Normal Inspection, Normal Range of Motion, Non-Tender


Skin: Reports: Warm, Dry, Intact


Neurological: Reports: No New Focal Deficit


Psy/Mental Status: Reports: Alert, Normal Affect

## 2021-03-11 NOTE — OR
DATE OF OPERATION:  03/11/2021

 

SURGEON:  Tal Victoria MD

 

PREOPERATIVE DIAGNOSES:  Abdominal pain and dark stools.

 

POSTOPERATIVE DIAGNOSES:  Abdominal pain and dark stools.

 

PROCEDURE:  EGD and incomplete colonoscopy.

 

ANESTHESIA:  MAC.

 

ESTIMATED BLOOD LOSS:  Minimal.

 

COMPLICATIONS:  None.

 

INDICATION FOR PROCEDURE:  The patient is a 66-year-old female who has had a 6-month history

of abdominal pain.  She has also had a couple weeks of dark tarry stools, which have now

resolved.  Does have history of gastric ulcers in the past.  Otherwise, she has been doing

well.  She is here today for EGD and colonoscopy.

 

DESCRIPTION OF PROCEDURE:  Informed consent was obtained from the patient.  The patient was

taken to the operating room and placed on table in left lateral decubitus position.

Monitored anesthesia care was administered.  Esophagus scope then advanced through the mouth

and directed towards the second portion of duodenum.  Duodenum was reached and appeared

normal.  Gastric antrum did have some mild gastritis.  Biopsy was taken for H pylori.

Retroflexion performed in the stomach, was also otherwise unremarkable.  She did have a

small hiatal hernia.  The esophagus was otherwise normal.  Gastroscope then withdrawn.  We

then proceeded with colonoscopy.  Digital rectal exam performed, it was normal.  Colonoscope

then advanced through the anus and directed towards the sigmoid colon.  The sigmoid was

reached, however, was tortuous, unable to negotiate a sharp corner.  Ped scope was then

utilized also unsuccessfully.  The patient was repositioned in the supine position which was

also otherwise unsuccessful.  I terminated the procedure at that point due to a tortuous

sigmoid colon.  The colonoscope then withdrawn.

 

FINDINGS:  Mild gastritis in the stomach and hiatal hernia, otherwise unremarkable and

incomplete colonoscopy.

 

RECOMMENDATIONS:  We will follow up on H pylori biopsies.  Otherwise, would continue her PPI

therapy.  We will also set the patient up for CT colonography to assess the remainder of the

colon.

 

 

MN/HAILEY

DD:  03/11/2021 14:41:14

DT:  03/11/2021 17:23:23

Job #:  [id number]